# Patient Record
Sex: MALE | Race: BLACK OR AFRICAN AMERICAN | NOT HISPANIC OR LATINO | Employment: FULL TIME | URBAN - METROPOLITAN AREA
[De-identification: names, ages, dates, MRNs, and addresses within clinical notes are randomized per-mention and may not be internally consistent; named-entity substitution may affect disease eponyms.]

---
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
## 2024-02-27 ENCOUNTER — HOSPITAL ENCOUNTER (EMERGENCY)
Facility: HOSPITAL | Age: 19
Discharge: HOME | End: 2024-02-28
Attending: EMERGENCY MEDICINE
Payer: COMMERCIAL

## 2024-02-27 DIAGNOSIS — S22.31XA CLOSED FRACTURE OF ONE RIB OF RIGHT SIDE, INITIAL ENCOUNTER: ICD-10-CM

## 2024-02-27 DIAGNOSIS — S27.0XXA TRAUMATIC PNEUMOTHORAX, INITIAL ENCOUNTER: Primary | ICD-10-CM

## 2024-02-27 LAB
ALBUMIN SERPL BCP-MCNC: 4.1 G/DL (ref 3.5–5.2)
ALP SERPL-CCNC: 70 U/L (ref 55–135)
ALT SERPL W/O P-5'-P-CCNC: 40 U/L (ref 10–44)
ANION GAP SERPL CALC-SCNC: 10 MMOL/L (ref 8–16)
AST SERPL-CCNC: 38 U/L (ref 10–40)
BASOPHILS # BLD AUTO: 0.06 K/UL (ref 0–0.2)
BASOPHILS NFR BLD: 0.4 % (ref 0–1.9)
BILIRUB SERPL-MCNC: 0.3 MG/DL (ref 0.1–1)
BILIRUB UR QL STRIP: NEGATIVE
BUN SERPL-MCNC: 14 MG/DL (ref 6–20)
CALCIUM SERPL-MCNC: 8.9 MG/DL (ref 8.7–10.5)
CHLORIDE SERPL-SCNC: 104 MMOL/L (ref 95–110)
CK SERPL-CCNC: 162 U/L (ref 20–200)
CLARITY UR: CLEAR
CO2 SERPL-SCNC: 26 MMOL/L (ref 23–29)
COLOR UR: YELLOW
CREAT SERPL-MCNC: 0.9 MG/DL (ref 0.5–1.4)
DIFFERENTIAL METHOD BLD: ABNORMAL
EOSINOPHIL # BLD AUTO: 0.1 K/UL (ref 0–0.5)
EOSINOPHIL NFR BLD: 0.8 % (ref 0–8)
ERYTHROCYTE [DISTWIDTH] IN BLOOD BY AUTOMATED COUNT: 13.2 % (ref 11.5–14.5)
EST. GFR  (NO RACE VARIABLE): >60 ML/MIN/1.73 M^2
GLUCOSE SERPL-MCNC: 108 MG/DL (ref 70–110)
GLUCOSE UR QL STRIP: NEGATIVE
HCT VFR BLD AUTO: 43.9 % (ref 40–54)
HGB BLD-MCNC: 14.4 G/DL (ref 14–18)
HGB UR QL STRIP: NEGATIVE
IMM GRANULOCYTES # BLD AUTO: 0.09 K/UL (ref 0–0.04)
IMM GRANULOCYTES NFR BLD AUTO: 0.6 % (ref 0–0.5)
KETONES UR QL STRIP: ABNORMAL
LEUKOCYTE ESTERASE UR QL STRIP: NEGATIVE
LIPASE SERPL-CCNC: 17 U/L (ref 4–60)
LYMPHOCYTES # BLD AUTO: 1.8 K/UL (ref 1–4.8)
LYMPHOCYTES NFR BLD: 11.4 % (ref 18–48)
MCH RBC QN AUTO: 27.2 PG (ref 27–31)
MCHC RBC AUTO-ENTMCNC: 32.8 G/DL (ref 32–36)
MCV RBC AUTO: 83 FL (ref 82–98)
MONOCYTES # BLD AUTO: 1.1 K/UL (ref 0.3–1)
MONOCYTES NFR BLD: 6.7 % (ref 4–15)
NEUTROPHILS # BLD AUTO: 12.8 K/UL (ref 1.8–7.7)
NEUTROPHILS NFR BLD: 80.1 % (ref 38–73)
NITRITE UR QL STRIP: NEGATIVE
NRBC BLD-RTO: 0 /100 WBC
PH UR STRIP: 6 [PH] (ref 5–8)
PLATELET # BLD AUTO: 278 K/UL (ref 150–450)
PMV BLD AUTO: 9.5 FL (ref 9.2–12.9)
POTASSIUM SERPL-SCNC: 3.8 MMOL/L (ref 3.5–5.1)
PROT SERPL-MCNC: 7.4 G/DL (ref 6–8.4)
PROT UR QL STRIP: NEGATIVE
RBC # BLD AUTO: 5.3 M/UL (ref 4.6–6.2)
SODIUM SERPL-SCNC: 140 MMOL/L (ref 136–145)
SP GR UR STRIP: 1.02 (ref 1–1.03)
URN SPEC COLLECT METH UR: ABNORMAL
UROBILINOGEN UR STRIP-ACNC: NEGATIVE EU/DL
WBC # BLD AUTO: 16.01 K/UL (ref 3.9–12.7)

## 2024-02-27 PROCEDURE — 99285 EMERGENCY DEPT VISIT HI MDM: CPT | Mod: 25

## 2024-02-27 PROCEDURE — 81003 URINALYSIS AUTO W/O SCOPE: CPT | Performed by: NURSE PRACTITIONER

## 2024-02-27 PROCEDURE — 74177 CT ABD & PELVIS W/CONTRAST: CPT | Mod: TC

## 2024-02-27 PROCEDURE — 63600175 PHARM REV CODE 636 W HCPCS: Performed by: NURSE PRACTITIONER

## 2024-02-27 PROCEDURE — 96374 THER/PROPH/DIAG INJ IV PUSH: CPT

## 2024-02-27 PROCEDURE — 25000003 PHARM REV CODE 250: Performed by: NURSE PRACTITIONER

## 2024-02-27 PROCEDURE — 85025 COMPLETE CBC W/AUTO DIFF WBC: CPT | Performed by: NURSE PRACTITIONER

## 2024-02-27 PROCEDURE — 71260 CT THORAX DX C+: CPT | Mod: TC

## 2024-02-27 PROCEDURE — 96375 TX/PRO/DX INJ NEW DRUG ADDON: CPT

## 2024-02-27 PROCEDURE — 83690 ASSAY OF LIPASE: CPT | Performed by: NURSE PRACTITIONER

## 2024-02-27 PROCEDURE — 96361 HYDRATE IV INFUSION ADD-ON: CPT

## 2024-02-27 PROCEDURE — 74177 CT ABD & PELVIS W/CONTRAST: CPT | Mod: 26,,, | Performed by: RADIOLOGY

## 2024-02-27 PROCEDURE — 82550 ASSAY OF CK (CPK): CPT | Performed by: NURSE PRACTITIONER

## 2024-02-27 PROCEDURE — 25000003 PHARM REV CODE 250: Performed by: EMERGENCY MEDICINE

## 2024-02-27 PROCEDURE — 80053 COMPREHEN METABOLIC PANEL: CPT | Performed by: NURSE PRACTITIONER

## 2024-02-27 PROCEDURE — 25500020 PHARM REV CODE 255: Performed by: EMERGENCY MEDICINE

## 2024-02-27 PROCEDURE — 71260 CT THORAX DX C+: CPT | Mod: 26,,, | Performed by: RADIOLOGY

## 2024-02-27 RX ORDER — MORPHINE SULFATE 2 MG/ML
4 INJECTION, SOLUTION INTRAMUSCULAR; INTRAVENOUS
Status: COMPLETED | OUTPATIENT
Start: 2024-02-27 | End: 2024-02-27

## 2024-02-27 RX ORDER — ONDANSETRON HYDROCHLORIDE 2 MG/ML
4 INJECTION, SOLUTION INTRAVENOUS
Status: COMPLETED | OUTPATIENT
Start: 2024-02-27 | End: 2024-02-27

## 2024-02-27 RX ADMIN — IOHEXOL 100 ML: 350 INJECTION, SOLUTION INTRAVENOUS at 09:02

## 2024-02-27 RX ADMIN — SODIUM CHLORIDE 1000 ML: 9 INJECTION, SOLUTION INTRAVENOUS at 08:02

## 2024-02-27 RX ADMIN — SODIUM CHLORIDE 1000 ML: 9 INJECTION, SOLUTION INTRAVENOUS at 10:02

## 2024-02-27 RX ADMIN — MORPHINE SULFATE 4 MG: 2 INJECTION, SOLUTION INTRAMUSCULAR; INTRAVENOUS at 08:02

## 2024-02-27 RX ADMIN — ONDANSETRON 4 MG: 2 INJECTION INTRAMUSCULAR; INTRAVENOUS at 08:02

## 2024-02-28 ENCOUNTER — HOSPITAL ENCOUNTER (OUTPATIENT)
Facility: HOSPITAL | Age: 19
Discharge: HOME | End: 2024-02-28
Attending: EMERGENCY MEDICINE | Admitting: STUDENT IN AN ORGANIZED HEALTH CARE EDUCATION/TRAINING PROGRAM
Payer: MEDICAID

## 2024-02-28 VITALS
TEMPERATURE: 98 F | OXYGEN SATURATION: 100 % | BODY MASS INDEX: 37.22 KG/M2 | HEIGHT: 70 IN | DIASTOLIC BLOOD PRESSURE: 53 MMHG | HEART RATE: 55 BPM | SYSTOLIC BLOOD PRESSURE: 115 MMHG | WEIGHT: 260 LBS | RESPIRATION RATE: 18 BRPM

## 2024-02-28 VITALS
OXYGEN SATURATION: 97 % | TEMPERATURE: 98 F | BODY MASS INDEX: 37.31 KG/M2 | DIASTOLIC BLOOD PRESSURE: 56 MMHG | SYSTOLIC BLOOD PRESSURE: 114 MMHG | RESPIRATION RATE: 20 BRPM | WEIGHT: 260 LBS | HEART RATE: 83 BPM

## 2024-02-28 DIAGNOSIS — S27.0XXA TRAUMATIC PNEUMOTHORAX, INITIAL ENCOUNTER: Primary | ICD-10-CM

## 2024-02-28 DIAGNOSIS — S22.31XA CLOSED FRACTURE OF ONE RIB OF RIGHT SIDE, INITIAL ENCOUNTER: ICD-10-CM

## 2024-02-28 LAB
ANION GAP SERPL CALC-SCNC: 8 MMOL/L (ref 8–16)
BASOPHILS # BLD AUTO: 0.04 K/UL (ref 0–0.2)
BASOPHILS NFR BLD: 0.5 % (ref 0–1.9)
BUN SERPL-MCNC: 7 MG/DL (ref 6–20)
CALCIUM SERPL-MCNC: 8.9 MG/DL (ref 8.7–10.5)
CHLORIDE SERPL-SCNC: 109 MMOL/L (ref 95–110)
CO2 SERPL-SCNC: 24 MMOL/L (ref 23–29)
CREAT SERPL-MCNC: 0.8 MG/DL (ref 0.5–1.4)
DIFFERENTIAL METHOD BLD: ABNORMAL
EOSINOPHIL # BLD AUTO: 0.1 K/UL (ref 0–0.5)
EOSINOPHIL NFR BLD: 0.9 % (ref 0–8)
ERYTHROCYTE [DISTWIDTH] IN BLOOD BY AUTOMATED COUNT: 13.4 % (ref 11.5–14.5)
EST. GFR  (NO RACE VARIABLE): >60 ML/MIN/1.73 M^2
GLUCOSE SERPL-MCNC: 87 MG/DL (ref 70–110)
HCT VFR BLD AUTO: 41.3 % (ref 40–54)
HGB BLD-MCNC: 13.5 G/DL (ref 14–18)
IMM GRANULOCYTES # BLD AUTO: 0.04 K/UL (ref 0–0.04)
IMM GRANULOCYTES NFR BLD AUTO: 0.5 % (ref 0–0.5)
LYMPHOCYTES # BLD AUTO: 1.9 K/UL (ref 1–4.8)
LYMPHOCYTES NFR BLD: 22.1 % (ref 18–48)
MCH RBC QN AUTO: 27.3 PG (ref 27–31)
MCHC RBC AUTO-ENTMCNC: 32.7 G/DL (ref 32–36)
MCV RBC AUTO: 83 FL (ref 82–98)
MONOCYTES # BLD AUTO: 0.7 K/UL (ref 0.3–1)
MONOCYTES NFR BLD: 8.4 % (ref 4–15)
NEUTROPHILS # BLD AUTO: 5.7 K/UL (ref 1.8–7.7)
NEUTROPHILS NFR BLD: 67.6 % (ref 38–73)
NRBC BLD-RTO: 0 /100 WBC
PLATELET # BLD AUTO: 249 K/UL (ref 150–450)
PMV BLD AUTO: 9.8 FL (ref 9.2–12.9)
POTASSIUM SERPL-SCNC: 4 MMOL/L (ref 3.5–5.1)
RBC # BLD AUTO: 4.95 M/UL (ref 4.6–6.2)
SODIUM SERPL-SCNC: 141 MMOL/L (ref 136–145)
WBC # BLD AUTO: 8.48 K/UL (ref 3.9–12.7)

## 2024-02-28 PROCEDURE — G0378 HOSPITAL OBSERVATION PER HR: HCPCS

## 2024-02-28 PROCEDURE — 94761 N-INVAS EAR/PLS OXIMETRY MLT: CPT

## 2024-02-28 PROCEDURE — 80048 BASIC METABOLIC PNL TOTAL CA: CPT | Performed by: STUDENT IN AN ORGANIZED HEALTH CARE EDUCATION/TRAINING PROGRAM

## 2024-02-28 PROCEDURE — 25000003 PHARM REV CODE 250: Performed by: STUDENT IN AN ORGANIZED HEALTH CARE EDUCATION/TRAINING PROGRAM

## 2024-02-28 PROCEDURE — 96374 THER/PROPH/DIAG INJ IV PUSH: CPT

## 2024-02-28 PROCEDURE — 99222 1ST HOSP IP/OBS MODERATE 55: CPT | Mod: ,,, | Performed by: STUDENT IN AN ORGANIZED HEALTH CARE EDUCATION/TRAINING PROGRAM

## 2024-02-28 PROCEDURE — 96375 TX/PRO/DX INJ NEW DRUG ADDON: CPT

## 2024-02-28 PROCEDURE — 63600175 PHARM REV CODE 636 W HCPCS: Performed by: EMERGENCY MEDICINE

## 2024-02-28 PROCEDURE — 85025 COMPLETE CBC W/AUTO DIFF WBC: CPT | Performed by: STUDENT IN AN ORGANIZED HEALTH CARE EDUCATION/TRAINING PROGRAM

## 2024-02-28 PROCEDURE — 25000003 PHARM REV CODE 250: Performed by: EMERGENCY MEDICINE

## 2024-02-28 PROCEDURE — 99285 EMERGENCY DEPT VISIT HI MDM: CPT | Mod: 25

## 2024-02-28 RX ORDER — KETOROLAC TROMETHAMINE 30 MG/ML
15 INJECTION, SOLUTION INTRAMUSCULAR; INTRAVENOUS
Status: COMPLETED | OUTPATIENT
Start: 2024-02-28 | End: 2024-02-28

## 2024-02-28 RX ORDER — ONDANSETRON HYDROCHLORIDE 2 MG/ML
4 INJECTION, SOLUTION INTRAVENOUS EVERY 6 HOURS PRN
Status: DISCONTINUED | OUTPATIENT
Start: 2024-02-28 | End: 2024-02-28 | Stop reason: HOSPADM

## 2024-02-28 RX ORDER — SODIUM CHLORIDE 0.9 % (FLUSH) 0.9 %
10 SYRINGE (ML) INJECTION
Status: DISCONTINUED | OUTPATIENT
Start: 2024-02-28 | End: 2024-02-28 | Stop reason: HOSPADM

## 2024-02-28 RX ORDER — MORPHINE SULFATE 4 MG/ML
4 INJECTION, SOLUTION INTRAMUSCULAR; INTRAVENOUS
Status: COMPLETED | OUTPATIENT
Start: 2024-02-28 | End: 2024-02-28

## 2024-02-28 RX ORDER — IBUPROFEN 600 MG/1
600 TABLET ORAL 3 TIMES DAILY
Qty: 42 TABLET | Refills: 0 | Status: SHIPPED | OUTPATIENT
Start: 2024-02-28 | End: 2024-03-13

## 2024-02-28 RX ORDER — LIDOCAINE 50 MG/G
1 PATCH TOPICAL ONCE
Qty: 1 PATCH | Refills: 0 | Status: SHIPPED | OUTPATIENT
Start: 2024-02-28 | End: 2024-02-29

## 2024-02-28 RX ORDER — ACETAMINOPHEN 500 MG
500 TABLET ORAL EVERY 6 HOURS
Status: DISCONTINUED | OUTPATIENT
Start: 2024-02-28 | End: 2024-02-28 | Stop reason: HOSPADM

## 2024-02-28 RX ORDER — ENOXAPARIN SODIUM 100 MG/ML
40 INJECTION SUBCUTANEOUS EVERY 12 HOURS
Status: DISCONTINUED | OUTPATIENT
Start: 2024-02-28 | End: 2024-02-28 | Stop reason: HOSPADM

## 2024-02-28 RX ORDER — ACETAMINOPHEN 500 MG
1000 TABLET ORAL EVERY 8 HOURS PRN
Qty: 42 TABLET | Refills: 0 | Status: SHIPPED | OUTPATIENT
Start: 2024-02-28 | End: 2024-03-13

## 2024-02-28 RX ORDER — TRAMADOL HYDROCHLORIDE 50 MG/1
50 TABLET ORAL EVERY 6 HOURS PRN
Qty: 8 TABLET | Refills: 0 | Status: SHIPPED | OUTPATIENT
Start: 2024-02-28 | End: 2024-03-06

## 2024-02-28 RX ORDER — TRAMADOL HYDROCHLORIDE 50 MG/1
50 TABLET ORAL EVERY 6 HOURS PRN
Status: DISCONTINUED | OUTPATIENT
Start: 2024-02-28 | End: 2024-02-28 | Stop reason: HOSPADM

## 2024-02-28 RX ORDER — IBUPROFEN 400 MG/1
400 TABLET ORAL EVERY 6 HOURS
Status: DISCONTINUED | OUTPATIENT
Start: 2024-02-28 | End: 2024-02-28 | Stop reason: HOSPADM

## 2024-02-28 RX ORDER — LIDOCAINE 50 MG/G
1 PATCH TOPICAL
Status: COMPLETED | OUTPATIENT
Start: 2024-02-28 | End: 2024-02-28

## 2024-02-28 RX ADMIN — TRAMADOL HYDROCHLORIDE 50 MG: 50 TABLET, COATED ORAL at 11:02

## 2024-02-28 RX ADMIN — IBUPROFEN 400 MG: 400 TABLET ORAL at 11:02

## 2024-02-28 RX ADMIN — ACETAMINOPHEN 500 MG: 500 TABLET ORAL at 11:02

## 2024-02-28 RX ADMIN — KETOROLAC TROMETHAMINE 15 MG: 30 INJECTION, SOLUTION INTRAMUSCULAR; INTRAVENOUS at 03:02

## 2024-02-28 RX ADMIN — LIDOCAINE 5% 1 PATCH: 700 PATCH TOPICAL at 03:02

## 2024-02-28 RX ADMIN — MORPHINE SULFATE 4 MG: 4 INJECTION INTRAVENOUS at 03:02

## 2024-02-28 NOTE — DISCHARGE SUMMARY
[facility]  General Surgery  Discharge Summary      Patient Name: Benitez Garcia  MRN: [id number]  Admission Date: 2/28/2024  Hospital Length of Stay: 0 days  Discharge Date and Time:  02/28/2024 12:11 PM  Attending Physician: George Pedroza MD   Discharging Provider: Michael Quinn MD  Primary Care Provider: Rosmery Primary Doctor    HPI:   Benitez Garcia is a 18 y.o. male with a history of vaping and obesity (BMI 37.3) who presents as a transfer from [facility] following blunt trauma to the chest with an associated right anterior 6th rib fracture and trace pneumothorax. 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 Around 5-6 PM yesterday, he got crushed by one of the pipes essentially pinning him between his right chest and back. He was not able to continue working after this due to pain. He then went to the [facility] for evaluation. Upon arrival he was AF and HDS. Labs showed an elevated WBC at 16 but were otherwise unremarkable. A CT C/A/P was obtained. The CT chest showed a mildly displaced right anterior 6th rib fracture with a trace PTX. CT A/P was unremarkable. He was then transferred here for evaluation. He is currently breathing comfortably on RA with sats >98% on my interview.      Indwelling Lines/Drains at time of discharge: None      Hospital Course: Mr. Garcia presented to [facility] as a transfer on 2/28/24 after experiencing a trauma at work where a 20 ton pipe rolled on him. He came in hemodynamically stable and affebrile. He was found to have a fractured right 6th rib. He was admitted under observation on our med/surg floor for further care and monitoring. His course was uneventful and he progressed according to plan.  His pain was controlled with a combination PO multimodal pain medications. His diet has been advanced throughout his hospital stay. He is now ambulating without assistance, tolerating a regular diet, pain is well controlled with PO pain medication, and he is voiding  appropriately. He now meets all criteria for discharge.     Goals of Care Treatment Preferences:  Code Status: Full Code      Consults: General Surgery and admitted to general surgery observation    Significant Diagnostic Studies: Labs: CMP   Recent Labs   Lab 02/27/24 2005 02/28/24  0542    141   K 3.8 4.0    109   CO2 26 24    87   BUN 14 7   CREATININE 0.9 0.8   CALCIUM 8.9 8.9   PROT 7.4  --    ALBUMIN 4.1  --    BILITOT 0.3  --    ALKPHOS 70  --    AST 38  --    ALT 40  --    ANIONGAP 10 8    and CBC   Recent Labs   Lab 02/27/24 2005 02/28/24  0542   WBC 16.01* 8.48   HGB 14.4 13.5*   HCT 43.9 41.3    249       Pending Diagnostic Studies:       None          Final Active Diagnoses:    Diagnosis Date Noted POA    PRINCIPAL PROBLEM:  Closed fracture of one rib of right side [S22.31XA] 02/28/2024 Yes      Problems Resolved During this Admission:      Discharged Condition: good    Disposition: Home or Self Care    Follow Up:    Patient Instructions:   No discharge procedures on file.  Medications:  Reconciled Home Medications:      Medication List        START taking these medications      acetaminophen 500 MG tablet  Commonly known as: TYLENOL  Take 2 tablets (1,000 mg total) by mouth every 8 (eight) hours as needed for Pain.     ibuprofen 600 MG tablet  Commonly known as: ADVIL,MOTRIN  Take 1 tablet (600 mg total) by mouth 3 (three) times daily. for 14 days     LIDOcaine 5 %  Commonly known as: LIDODERM  Place 1 patch onto the skin once. Remove & Discard patch within 12 hours or as directed by MD for 1 dose     traMADoL 50 mg tablet  Commonly known as: ULTRAM  Take 1 tablet (50 mg total) by mouth every 6 (six) hours as needed for Pain (severe pain).            Time spent on the discharge of patient: 30 minutes    Micheal Quinn MD  General Surgery  [facility]

## 2024-02-28 NOTE — SUBJECTIVE & OBJECTIVE
Current Facility-Administered Medications on File Prior to Encounter   Medication    [COMPLETED] iohexoL (OMNIPAQUE 350) injection 100 mL    [COMPLETED] morphine injection 4 mg    [COMPLETED] ondansetron injection 4 mg    [COMPLETED] sodium chloride 0.9% bolus 1,000 mL 1,000 mL    [COMPLETED] sodium chloride 0.9% bolus 1,000 mL 1,000 mL     No current outpatient medications on file prior to encounter.       Review of patient's allergies indicates:   Allergen Reactions    Betadine [povidone-iodine]        No past medical history on file.  Past Surgical History:   Procedure Laterality Date    R hand surgery       Family History    None       Tobacco Use    Smoking status: Every Day     Types: Cigarettes, Vaping with nicotine    Smokeless tobacco: Never   Substance and Sexual Activity    Alcohol use: Not Currently    Drug use: Never    Sexual activity: Not on file     Review of Systems   Constitutional:  Negative for chills, diaphoresis, fever and unexpected weight change.   HENT:  Negative for sinus pressure and sore throat.    Eyes:  Negative for photophobia and visual disturbance.   Respiratory:  Negative for cough and shortness of breath.    Cardiovascular:  Positive for chest pain (reproducible). Negative for palpitations and leg swelling.   Gastrointestinal:  Negative for abdominal distention, abdominal pain, blood in stool, diarrhea, nausea and vomiting.   Musculoskeletal:  Negative for arthralgias and myalgias.   Skin:  Negative for rash and wound.   Neurological:  Negative for syncope and headaches.   Psychiatric/Behavioral:  Negative for confusion and hallucinations.      Objective:     Vital Signs (Most Recent):  Pulse: 69 (02/28/24 0417)  Resp: 20 (02/28/24 0334)  BP: 127/63 (02/28/24 0417)  SpO2: 97 % (02/28/24 0417) Vital Signs (24h Range):  Temp:  [98.2 °F (36.8 °C)] 98.2 °F (36.8 °C)  Pulse:  [55-80] 69  Resp:  [18-22] 20  SpO2:  [96 %-100 %] 97 %  BP: (110-136)/(53-82) 127/63     Weight: 117.9 kg (260  lb)  Body mass index is 37.31 kg/m².     Physical Exam  Vitals and nursing note reviewed.   Constitutional:       General: He is not in acute distress.     Appearance: He is well-developed. He is not diaphoretic.   HENT:      Mouth/Throat:      Pharynx: Oropharynx is clear. No oropharyngeal exudate.   Eyes:      General: No scleral icterus.     Extraocular Movements: Extraocular movements intact.   Cardiovascular:      Rate and Rhythm: Normal rate and regular rhythm.   Pulmonary:      Effort: Pulmonary effort is normal. No respiratory distress.      Comments: Reproducible right chest wall tenderness  Abdominal:      General: There is no distension.      Palpations: Abdomen is soft.      Tenderness: There is no abdominal tenderness.   Musculoskeletal:         General: No deformity. Normal range of motion.   Skin:     General: Skin is warm and dry.      Coloration: Skin is not jaundiced.   Neurological:      General: No focal deficit present.      Mental Status: He is alert.      Cranial Nerves: No cranial nerve deficit.   Psychiatric:         Mood and Affect: Mood normal.         Behavior: Behavior normal.            I have reviewed all pertinent lab results within the past 24 hours.  CBC:   Recent Labs   Lab 02/27/24 2005   WBC 16.01*   RBC 5.30   HGB 14.4   HCT 43.9      MCV 83   MCH 27.2   MCHC 32.8     CMP:   Recent Labs   Lab 02/27/24 2005      CALCIUM 8.9   ALBUMIN 4.1   PROT 7.4      K 3.8   CO2 26      BUN 14   CREATININE 0.9   ALKPHOS 70   ALT 40   AST 38   BILITOT 0.3       Significant Diagnostics:  I have reviewed all pertinent imaging results/findings within the past 24 hours.    CT C/A/P Reviewed  Right 6 rib fx - anterior  Very small PTX

## 2024-02-28 NOTE — TERTIARY TRAUMA SURVEY NOTE
TRAUMA TERTIARY EXAM   Admit Date & Time: 2/28/2024  3:03 AM   Date & Time of Exam: 2/28/2024, 11:47 AM   Mental Status Adequate for Exam: Yes   Examiner: Michael Quinn MD  Primary Team: Acute Care Surgery    HPI: Benitez Garcia is a 18 y.o. male with a history of vaping and obesity (BMI 37.3) who presents as a transfer from [facility] following blunt trauma to the chest with an associated right anterior 6th rib fracture and trace pneumothorax. 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 Around 5-6 PM yesterday, he got crushed by one of the pipes essentially pinning him between his right chest and back. He was not able to continue working after this due to pain. He then went to the [facility] for evaluation. Upon arrival he was AF and HDS. Labs showed an elevated WBC at 16 but were otherwise unremarkable. A CT C/A/P was obtained. The CT chest showed a mildly displaced right anterior 6th rib fracture with a trace PTX. CT A/P was unremarkable. He was then transferred here for evaluation. He is currently breathing comfortably on RA with sats >98% on my interview.      Vital Signs:   Vitals:    02/28/24 1105   BP:    Pulse:    Resp: 18   Temp:        Neurologic: {Alert and oriented x3. Gait normal. Reflexes and motor strength normal and symmetric. Cranial nerves 2-12 and sensation grossly intact.   Glascow Coma Scale:   Motor 6 - Follows simple motor commands   Verbal 5 - Alert and oriented   Eye opening 4 - Opens eyes on own   TOTAL 15     HEENT   Head/Face: atraumatic, no bony tenderness, no ecchymosis  Eyes: negative, conjunctivae/corneas clear. PERRL, EOM's intact.   Ears: normal   Nose/sinus:negative  Throat/Oropharynx: mucous membranes moist, pharynx normal without lesions.     Neck: No cervical spine bony tenderness, crepitance, or stepoff, No nuchal rigidity, Normal range of motion, Trachea midline, and No masses    Chest: tenderness over lateral sixth rib on the right     Pulmonary:  non-labored breathing, equal chest rise, no flail chest    Cardiovascular   Heart:normal rate and regular rhythm   Peripheral vascular: 2+ and symmetric    Gastrointestinal   Abdominal: abdomen is soft without significant tenderness, masses, organomegaly or guarding   Rectal:not performed    Genitourinary: Normal     Musculoskeletal:   Back: full range of motion without pain, minimal tenderness lower right side paraspinal, no spasm, no curvature   Upper Extremities: unremarkable   Lower Extremities: unremarkable    Skin: no ecchymosis, erythema, abrasions or lacerations appreciated     Imaging Results   CT-Head: n/a  CT Maxillofacial: n/a  CT C-Spine: n/a  CT-Chest: Acute mildly displaced right anterior 6th rib fracture. Trace anterior right pneumothorax.     Mild bibasilar airspace disease or atelectasis.  CT-Abdomen/Pelvis: No acute findings. See separately dictated CT chest.     Chest x-ray: Single chest view is submitted.  There is a general pattern of accentuated attenuation consistent with soft tissue attenuation associated with body habitus.  There is diminished depth of inspiration.     When accounting for position and technique and depth of inspiration, the appearance of the cardiomediastinal silhouette is appropriate.     There is no evidence for confluent infiltrate or consolidation or significant pleural effusion.  The appearance of minimal pneumothorax on the right on the chest CT is not appreciated radiographically, there is no radiographically detectable pneumothorax.     Right-sided rib fractures noted on the chest CT is not identified radiographically, the osseous structures otherwise appear intac  Pelvis x-ray: n/a  Extremity x-rays: n/a    Assessment/Plan:     Benitez Garcia is a 18 y.o. male who presents with a right anterior 6th rib fracture and extremely small PTX. He is stable for discharge.     - Patient seen and examined. Labs and imaging reviewed.   - PTX is very small and does not warrant  intervention at this time  - MM pain control for the rib fracture  - IS given to patient  - Reg diet  - Trauma DVT ppx     Michael Quinn MD  General Surgery PGY-1    2/28/2024, 11:47 AM

## 2024-02-28 NOTE — CONSULTS
[facility] - Emergency Dept  General Surgery  Consult Note    Patient Name: Benitez Garcia  MRN: [id number]  Code Status: No Order  Admission Date: 2/28/2024  Hospital Length of Stay: 0 days  Attending Physician: Danae Weathers MD  Primary Care Provider: Rosmery Primary Doctor    Patient information was obtained from patient, past medical records, and ER records.     Inpatient consult to General surgery  Consult performed by: Nickie Veronica MD  Consult ordered by: Danae Weathers MD        Subjective:     Principal Problem: Closed fracture of one rib of right side    History of Present Illness: Benitez Garcia is a 18 y.o. male with a history of vaping and obesity (BMI 37.3) who presents as a transfer from [facility] following blunt trauma to the chest with an associated right anterior 6th rib fracture and trace pneumothorax. 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 Around 5-6 PM yesterday, he got crushed by one of the pipes essentially pinning him between his right chest and back. He was not able to continue working after this due to pain. He then went to the [facility] for evaluation. Upon arrival he was AF and HDS. Labs showed an elevated WBC at 16 but were otherwise unremarkable. A CT C/A/P was obtained. The CT chest showed a mildly displaced right anterior 6th rib fracture with a trace PTX. CT A/P was unremarkable. He was then transferred here for evaluation. He is currently breathing comfortably on RA with sats >98% on my interview.     Current Facility-Administered Medications on File Prior to Encounter   Medication    [COMPLETED] iohexoL (OMNIPAQUE 350) injection 100 mL    [COMPLETED] morphine injection 4 mg    [COMPLETED] ondansetron injection 4 mg    [COMPLETED] sodium chloride 0.9% bolus 1,000 mL 1,000 mL    [COMPLETED] sodium chloride 0.9% bolus 1,000 mL 1,000 mL     No current outpatient medications on file prior to encounter.       Review of patient's allergies indicates:   Allergen  Reactions    Betadine [povidone-iodine]        No past medical history on file.  Past Surgical History:   Procedure Laterality Date    R hand surgery       Family History    None       Tobacco Use    Smoking status: Every Day     Types: Cigarettes, Vaping with nicotine    Smokeless tobacco: Never   Substance and Sexual Activity    Alcohol use: Not Currently    Drug use: Never    Sexual activity: Not on file     Review of Systems   Constitutional:  Negative for chills, diaphoresis, fever and unexpected weight change.   HENT:  Negative for sinus pressure and sore throat.    Eyes:  Negative for photophobia and visual disturbance.   Respiratory:  Negative for cough and shortness of breath.    Cardiovascular:  Positive for chest pain (reproducible). Negative for palpitations and leg swelling.   Gastrointestinal:  Negative for abdominal distention, abdominal pain, blood in stool, diarrhea, nausea and vomiting.   Musculoskeletal:  Negative for arthralgias and myalgias.   Skin:  Negative for rash and wound.   Neurological:  Negative for syncope and headaches.   Psychiatric/Behavioral:  Negative for confusion and hallucinations.      Objective:     Vital Signs (Most Recent):  Pulse: 69 (02/28/24 0417)  Resp: 20 (02/28/24 0334)  BP: 127/63 (02/28/24 0417)  SpO2: 97 % (02/28/24 0417) Vital Signs (24h Range):  Temp:  [98.2 °F (36.8 °C)] 98.2 °F (36.8 °C)  Pulse:  [55-80] 69  Resp:  [18-22] 20  SpO2:  [96 %-100 %] 97 %  BP: (110-136)/(53-82) 127/63     Weight: 117.9 kg (260 lb)  Body mass index is 37.31 kg/m².     Physical Exam  Vitals and nursing note reviewed.   Constitutional:       General: He is not in acute distress.     Appearance: He is well-developed. He is not diaphoretic.   HENT:      Mouth/Throat:      Pharynx: Oropharynx is clear. No oropharyngeal exudate.   Eyes:      General: No scleral icterus.     Extraocular Movements: Extraocular movements intact.   Cardiovascular:      Rate and Rhythm: Normal rate and regular  rhythm.   Pulmonary:      Effort: Pulmonary effort is normal. No respiratory distress.      Comments: Reproducible right chest wall tenderness  Abdominal:      General: There is no distension.      Palpations: Abdomen is soft.      Tenderness: There is no abdominal tenderness.   Musculoskeletal:         General: No deformity. Normal range of motion.   Skin:     General: Skin is warm and dry.      Coloration: Skin is not jaundiced.   Neurological:      General: No focal deficit present.      Mental Status: He is alert.      Cranial Nerves: No cranial nerve deficit.   Psychiatric:         Mood and Affect: Mood normal.         Behavior: Behavior normal.            I have reviewed all pertinent lab results within the past 24 hours.  CBC:   Recent Labs   Lab 02/27/24 2005   WBC 16.01*   RBC 5.30   HGB 14.4   HCT 43.9      MCV 83   MCH 27.2   MCHC 32.8     CMP:   Recent Labs   Lab 02/27/24 2005      CALCIUM 8.9   ALBUMIN 4.1   PROT 7.4      K 3.8   CO2 26      BUN 14   CREATININE 0.9   ALKPHOS 70   ALT 40   AST 38   BILITOT 0.3       Significant Diagnostics:  I have reviewed all pertinent imaging results/findings within the past 24 hours.    CT C/A/P Reviewed  Right 6 rib fx - anterior  Very small PTX      Assessment/Plan:     * Closed fracture of one rib of right side  Benitez Garcia is a 18 y.o. male who presents with a right anterior 6th rib fracture and extremely small PTX.    - Patient seen and examined. Labs and imaging reviewed.   - PTX is very small and does not warrant intervention at this time  - MM pain control for the rib fracture  - Repeat CXR in the AM and tertiary exam  - Can likely go home tomorrow  - Reg diet  - Trauma DVT ppx      VTE Risk Mitigation (From admission, onward)      None            Thank you for your consult. I will follow-up with patient. Please contact us if you have any additional questions.    Nickie Veronica MD  General Surgery  [facility] - Emergency Dept

## 2024-02-28 NOTE — ED PROVIDER NOTES
Encounter Date: 2/27/2024       History     Chief Complaint   Patient presents with    Rib Injury     Patient with c/o right sided rib pain after being struck by steel pipe; Patient states he was pinned between pipe and platform; Workman's comp     POV to ED with mother.  Patient complains of right rib pain.  Onset about 1800 today.  States that he was at his job and he was crushed between 2 pieces of pipe.  Denies head injury, no neck pain, altered mental state.  No LOC.  He denies fever vomiting or diarrhea.  Denies urinary symptoms.  No hematuria. No other complaints    The history is provided by the patient.     Review of patient's allergies indicates:   Allergen Reactions    Betadine [povidone-iodine]      No past medical history on file.  Past Surgical History:   Procedure Laterality Date    R hand surgery       No family history on file.  Social History     Tobacco Use    Smoking status: Every Day     Types: Cigarettes, Vaping with nicotine    Smokeless tobacco: Never   Substance Use Topics    Alcohol use: Not Currently    Drug use: Never     Review of Systems   Constitutional:  Negative for chills and fever.   Respiratory:          Right rib pain   Gastrointestinal:  Positive for abdominal distention.   All other systems reviewed and are negative.      Physical Exam     Initial Vitals [02/27/24 1931]   BP Pulse Resp Temp SpO2   118/82 80 (!) 22 98.2 °F (36.8 °C) 99 %      MAP       --         Physical Exam    Nursing note and vitals reviewed.  Constitutional: He appears well-developed and well-nourished. No distress.   obese   HENT:   Head: Normocephalic and atraumatic.   Eyes: EOM are normal. Pupils are equal, round, and reactive to light.   Neck:   No vertebral tenderness   Normal range of motion.  Cardiovascular:  Normal rate and regular rhythm.           Pulmonary/Chest: Breath sounds normal. No respiratory distress.   Tenderness noted to the right lateral anterior chest wall area.  No swelling or  discoloration.  No redness.  No bruising.  Skin intact. Painful ROM   Abdominal: Abdomen is soft. Bowel sounds are normal.   Tenderness right upper quadrant   Musculoskeletal:      Cervical back: Normal range of motion.      Comments: ROM intact but limited due to pain     Neurological: He is alert and oriented to person, place, and time. He has normal strength. GCS score is 15. GCS eye subscore is 4. GCS verbal subscore is 5. GCS motor subscore is 6.   Skin: Skin is warm and dry. Capillary refill takes less than 2 seconds.   Psychiatric: He has a normal mood and affect. Thought content normal.         ED Course   Procedures  Labs Reviewed   CBC W/ AUTO DIFFERENTIAL - Abnormal; Notable for the following components:       Result Value    WBC 16.01 (*)     Immature Granulocytes 0.6 (*)     Gran # (ANC) 12.8 (*)     Immature Grans (Abs) 0.09 (*)     Mono # 1.1 (*)     Gran % 80.1 (*)     Lymph % 11.4 (*)     All other components within normal limits   COMPREHENSIVE METABOLIC PANEL   LIPASE   URINALYSIS, REFLEX TO URINE CULTURE          Imaging Results              CT Abdomen Pelvis With IV Contrast NO Oral Contrast (In process)                      CT Chest With Contrast (In process)  Result time 02/27/24 22:04:35                  X-Rays:   Independently Interpreted Readings:   Other Readings:  XAMINATION:  CT CHEST WITH CONTRAST     CLINICAL HISTORY:  crush injury;     TECHNIQUE:  Low dose axial images, sagittal and coronal reformations were obtained from the thoracic inlet to the lung bases following the IV administration of 100 mL of Omnipaque 350.     COMPARISON:  None     FINDINGS:  Acute mildly displaced right anterior 6th rib fracture.  Trace anterior right pneumothorax.     Mild bibasilar airspace disease or atelectasis.     No cardiomegaly or pericardial effusion.  Normal caliber aorta and pulmonary arteries.     No suspicious lymphadenopathy.     Bilateral gynecomastia.     See separately dictated CT abdomen  pelvis     Impression:     Acute mildly displaced right anterior 6th rib fracture. Trace anterior right pneumothorax.     Mild bibasilar airspace disease or atelectasis.    XAMINATION:  CT ABDOMEN PELVIS WITH IV CONTRAST     CLINICAL HISTORY:  crush injury;     TECHNIQUE:  Low dose axial images, sagittal and coronal reformations were obtained from the lung bases to the pubic symphysis following the IV administration of 100 mL of Omnipaque 350 .  Oral contrast was not administered.     COMPARISON:  None.     FINDINGS:  Abdomen:     - Lower thorax:See same day chest CT     - Lung bases: No infiltrates and no nodules.     - Liver: No focal mass.     - Gallbladder: No calcified gallstones.     - Bile Ducts: No evidence of intra or extra hepatic biliary ductal dilation.     - Spleen: Negative.     - Kidneys: No mass or hydronephrosis.     - Adrenals: Unremarkable.  ancreas: No mass or peripancreatic fat stranding.     - Retroperitoneum:  No significant adenopathy.     - Vascular: No abdominal aortic aneurysm.     - Abdominal wall:  Unremarkable.     Pelvis:     No pelvic mass, adenopathy, or free fluid.     Bowel/Mesentery:     No evidence of bowel obstruction or inflammation.     Bones:  No acute osseous abnormality and no suspicious lytic or blastic lesion.     Impression:     No acute findings.  See separately dictated CT chest.        Medications   sodium chloride 0.9% bolus 1,000 mL 1,000 mL (0 mLs Intravenous Stopped 2/27/24 2054)   morphine injection 4 mg (4 mg Intravenous Given 2/27/24 2013)   ondansetron injection 4 mg (4 mg Intravenous Given 2/27/24 2013)   iohexoL (OMNIPAQUE 350) injection 100 mL (100 mLs Intravenous Given 2/27/24 2117)     Medical Decision Making  Presents for evaluation of right rib injury, crush injury.  Lab work ordered, CT ordered.  Disposition pending  Differentials include but not limited to dehydration, volume depletion, abnormal electrolytes, anemia, fracture, contusion, sprain,  pneumothorax, hemothorax, perforation, solid organ injury, blunt trauma  @ 2214 awaiting CT report  @ 2229 end of shift report to Dr Canas    Amount and/or Complexity of Data Reviewed  Labs: ordered.  Radiology: ordered.    Risk  Prescription drug management.               ED Course as of 02/27/24 2214 Tue Feb 27, 2024 2125   Complete Blood Count (CBC)    Final result 02/27 2005  WBC 16.01  Immature Granulocytes 0.6  Gran # (ANC) 12.8  Immature Grans (Abs) 0.09  Mono # 1.1  Gran % 80.1  Lymph % 11.4      [CP]      ED Course User Index  [CP] Caty Guthrie NP                           Clinical Impression:                 Caty Guthrie NP  02/27/24 2215       Caty Guthrie NP  02/27/24 2221       Caty Guthrie NP  02/27/24 2229

## 2024-02-28 NOTE — HPI
Benitez Garcia is a 18 y.o. male with a history of vaping and obesity (BMI 37.3) who presents as a transfer from [facility] following blunt trauma to the chest with an associated right anterior 6th rib fracture and trace pneumothorax. 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 Around 5-6 PM yesterday, he got crushed by one of the pipes essentially pinning him between his right chest and back. He was not able to continue working after this due to pain. He then went to the [facility] for evaluation. Upon arrival he was AF and HDS. Labs showed an elevated WBC at 16 but were otherwise unremarkable. A CT C/A/P was obtained. The CT chest showed a mildly displaced right anterior 6th rib fracture with a trace PTX. CT A/P was unremarkable. He was then transferred here for evaluation. He is currently breathing comfortably on RA with sats >98% on my interview.

## 2024-02-28 NOTE — H&P
Please see consult note dated 02/28/2024 for detailed H&P.    Nickie Veronica MD  General Surgery, PGY-5  [phone]

## 2024-02-28 NOTE — HOSPITAL COURSE
[place]. [place] [facility] [place] after experiencing a trauma at work where a 20 ton pipe rolled on him. He came in hemodynamically stable and affebrile. He was found to have a fractured right 6th rib. He was admitted under observation on our med/surg floor for further care and monitoring. His course was uneventful and he progressed according to plan.  His pain was controlled with a combination PO multimodal pain medications. His diet has been advanced throughout his hospital stay. He is now ambulating without assistance, tolerating a regular diet, pain is well controlled with PO pain medication, and he is voiding appropriately. He now meets all criteria for discharge.

## 2024-02-28 NOTE — ED PROVIDER NOTES
Encounter Date: 2/27/2024       History     Chief Complaint   Patient presents with    Transfer     Patient transfer from [place] for pneumothorax.     HPI    Pleasant 18-year-old male presents the ER as a transfer from outside facility for blunt chest trauma resulting in mildly displaced anterior 6th rib fracture as well as trace anterior new or thorax.  Patient reports that he was at his job and he was crushed between a pipe, struck him in his chest.  No head to head loss of consciousness.  Seen evaluated at outside hospital, pan scan revealed rib fracture and pneumothorax he was transported the ER for general surgery evaluation.  He arrived in the ER no acute distress hemodynamically stable endorsing right-sided rib pain.    Review of patient's allergies indicates:   Allergen Reactions    Betadine [povidone-iodine]      No past medical history on file.  Past Surgical History:   Procedure Laterality Date    R hand surgery       No family history on file.  Social History     Tobacco Use    Smoking status: Every Day     Types: Cigarettes, Vaping with nicotine    Smokeless tobacco: Never   Substance Use Topics    Alcohol use: Not Currently    Drug use: Never     Review of Systems   Cardiovascular:  Positive for chest pain.   All other systems reviewed and are negative.      Physical Exam     Initial Vitals   BP Pulse Resp Temp SpO2   02/28/24 0257 02/28/24 0257 02/28/24 0257 02/28/24 0447 02/28/24 0257   136/80 74 18 98.5 °F (36.9 °C) 98 %      MAP       --                Physical Exam    Nursing note and vitals reviewed.  Constitutional: He appears well-developed and well-nourished.   HENT:   Head: Normocephalic and atraumatic.   Eyes: EOM are normal. Pupils are equal, round, and reactive to light.   Neck:   Normal range of motion.  Cardiovascular:  Normal rate, regular rhythm and normal heart sounds.           Pulmonary/Chest: Breath sounds normal. No respiratory distress.   Reproducible right-sided rib pain no  overlying skin changes equal breath sounds   Abdominal: Abdomen is soft. He exhibits no distension. There is no abdominal tenderness.   Musculoskeletal:         General: Normal range of motion.      Cervical back: Normal range of motion.     Neurological: He is alert and oriented to person, place, and time. He has normal strength. GCS score is 15. GCS eye subscore is 4. GCS verbal subscore is 5. GCS motor subscore is 6.   Skin: Skin is warm and dry. Capillary refill takes less than 2 seconds.   Psychiatric: He has a normal mood and affect. Thought content normal.         ED Course   Procedures  Labs Reviewed   CBC W/ AUTO DIFFERENTIAL   BASIC METABOLIC PANEL          Imaging Results              X-Ray Chest 1 View (Final result)  Result time 02/28/24 04:56:29      Final result by Aaron Huber MD (02/28/24 04:56:29)                   Impression:      Radiographic findings as above.      Electronically signed by: Aaron Huber  Date:    02/28/2024  Time:    04:56               Narrative:    EXAMINATION:  XR CHEST 1 VIEW    CLINICAL HISTORY:  FU PTX;    TECHNIQUE:  Single frontal view of the chest was performed.    COMPARISON:  Chest CT February 27, 2024    FINDINGS:  Single chest view is submitted.  There is a general pattern of accentuated attenuation consistent with soft tissue attenuation associated with body habitus.  There is diminished depth of inspiration.    When accounting for position and technique and depth of inspiration, the appearance of the cardiomediastinal silhouette is appropriate.    There is no evidence for confluent infiltrate or consolidation or significant pleural effusion.  The appearance of minimal pneumothorax on the right on the chest CT is not appreciated radiographically, there is no radiographically detectable pneumothorax.    Right-sided rib fractures noted on the chest CT is not identified radiographically, the osseous structures otherwise appear intact.                                        Medications   LIDOcaine 5 % patch 1 patch (1 patch Transdermal Patch Applied 2/28/24 0322)   sodium chloride 0.9% flush 10 mL (has no administration in time range)   ondansetron injection 4 mg (has no administration in time range)   acetaminophen tablet 500 mg (has no administration in time range)   enoxaparin injection 40 mg (has no administration in time range)   ibuprofen tablet 400 mg (has no administration in time range)   traMADoL tablet 50 mg (has no administration in time range)   morphine injection 4 mg (4 mg Intravenous Given 2/28/24 0320)   ketorolac injection 15 mg (15 mg Intravenous Given 2/28/24 0318)     Medical Decision Making  18-year-old male presents the ER for evaluation of 6 right rib fracture and trace pneumothorax after blunt trauma to the chest.  Hemodynamically stable no acute distress no other major signs of trauma.  Having moderate pain from symptoms will plan pain control will consult General surgery and reassess.    Amount and/or Complexity of Data Reviewed  Independent Historian: EMS  External Data Reviewed: labs, radiology, ECG and notes.    Risk  Prescription drug management.  Parenteral controlled substances.               ED Course as of 02/28/24 0541   Wed Feb 28, 2024 0317 Discussed with general surgery will come and evaluate patient. [SE]      ED Course User Index  [SE] Danae Weathers MD                           Clinical Impression:  Final diagnoses:  [S22.31XA] Closed fracture of one rib of right side, initial encounter  [S27.0XXA] Traumatic pneumothorax, initial encounter (Primary)          ED Disposition Condition    Observation                 Danae Weathers MD  02/28/24 0541

## 2024-02-28 NOTE — ASSESSMENT & PLAN NOTE
Benitez Garcia is a 18 y.o. male who presents with a right anterior 6th rib fracture and extremely small PTX.    - Patient seen and examined. Labs and imaging reviewed.   - PTX is very small and does not warrant intervention at this time  - MM pain control for the rib fracture  - Repeat CXR in the AM and tertiary exam  - Can likely go home tomorrow  - Reg diet  - Trauma DVT ppx

## 2024-02-29 NOTE — PLAN OF CARE
Crist[facility]bal ste[facility]  Discharge Final Note    Primary Care Provider: No, Primary Doctor  Expected Discharge Date: 2/28/2024    Patient medically ready for discharge to home.     Transportation by family.      Is family/patient aware of discharge: yes     Hospital follow up scheduled: yes       Final Discharge Note (most recent)       Final Note - 02/29/24 1514          Final Note    Assessment Type Final Discharge Note     Anticipated Discharge Disposition Home or Self Care     Hospital Resources/Appts/Education Provided Provided patient/caregiver with written discharge plan information                     Important Message from Medicare         Referral Info (most recent)       Referral Info    No documentation.                   No future appointments.  Burton Cristina RN  Case Management  Ext: [phone]  02/29/2024